# Patient Record
Sex: MALE | Race: WHITE | Employment: UNEMPLOYED | URBAN - METROPOLITAN AREA
[De-identification: names, ages, dates, MRNs, and addresses within clinical notes are randomized per-mention and may not be internally consistent; named-entity substitution may affect disease eponyms.]

---

## 2017-12-01 ENCOUNTER — HOSPITAL ENCOUNTER (OUTPATIENT)
Dept: GENERAL RADIOLOGY | Age: 22
Discharge: HOME | End: 2017-12-01
Attending: FAMILY MEDICINE
Payer: COMMERCIAL

## 2017-12-01 ENCOUNTER — OFFICE VISIT (OUTPATIENT)
Dept: FAMILY MEDICINE CLINIC | Facility: CLINIC | Age: 22
End: 2017-12-01

## 2017-12-01 VITALS
DIASTOLIC BLOOD PRESSURE: 71 MMHG | HEART RATE: 66 BPM | HEIGHT: 69.5 IN | TEMPERATURE: 98 F | WEIGHT: 209 LBS | BODY MASS INDEX: 30.26 KG/M2 | SYSTOLIC BLOOD PRESSURE: 132 MMHG

## 2017-12-01 DIAGNOSIS — M22.12 RECURRENT SUBLUXATION OF LEFT PATELLA: ICD-10-CM

## 2017-12-01 DIAGNOSIS — S83.012A LATERAL SUBLUXATION OF LEFT PATELLA, INITIAL ENCOUNTER: ICD-10-CM

## 2017-12-01 DIAGNOSIS — Z00.00 ADULT GENERAL MEDICAL EXAM: Primary | ICD-10-CM

## 2017-12-01 DIAGNOSIS — E66.09 NON MORBID OBESITY DUE TO EXCESS CALORIES: ICD-10-CM

## 2017-12-01 PROCEDURE — 99385 PREV VISIT NEW AGE 18-39: CPT | Performed by: FAMILY MEDICINE

## 2017-12-01 PROCEDURE — 73564 X-RAY EXAM KNEE 4 OR MORE: CPT | Performed by: FAMILY MEDICINE

## 2017-12-01 NOTE — PROGRESS NOTES
Patient ID: Kayode Mckay is a 25year old male. HPI  Patient presents with:  Physical  He states he graduated college in May. He is looking for a job. He does not smoke and he is single.     He states a few years ago he was at [employer] in an i Psychiatric/Behavioral: Negative for dysphoric mood and hallucinations. The patient is not nervous/anxious. Past Medical History:   Diagnosis Date   • Chicken pox 2001   • Fracture 05/2007    per [employer]: \"Fx blane vicente\";  \"Management: hepatosplenomegaly. There is no tenderness. Lymphadenopathy:     He has no cervical adenopathy. Neurological: He is alert and oriented to person, place, and time. He has normal reflexes. No cranial nerve deficit. Skin: Skin is warm and dry.  No rash n subluxation of left patella  -     XR KNEE, COMPLETE (4 OR MORE VIEWS), LEFT (ATX=[id number]); Future  -     PHYSICAL THERAPY - INTERNAL  He will start formal physical therapy and get an x-ray of the knee.   Lateral subluxation of left patella, initial encounter

## 2017-12-04 ENCOUNTER — TELEPHONE (OUTPATIENT)
Dept: OTHER | Age: 22
End: 2017-12-04

## 2017-12-04 NOTE — TELEPHONE ENCOUNTER
----- Message from Bushra Lang RN sent at 12/4/2017  8:40 AM CST -----      ----- Message -----  From: Ev Bonilla DO  Sent: 12/2/2017   6:04 PM  To:  Em Fm Ado Lpn/C[facility]    Left knee x-ray is normal.  You should really see a physical therapist due to t

## 2017-12-04 NOTE — TELEPHONE ENCOUNTER
Verified pt name and . Reviewed Xray results and recommendations with pt per doctor's instructions. Pt states he may consider having physical therapy for his knee after the holidays, did not want to start at this time.

## 2017-12-05 ENCOUNTER — TELEPHONE (OUTPATIENT)
Dept: FAMILY MEDICINE CLINIC | Facility: CLINIC | Age: 22
End: 2017-12-05

## 2017-12-05 NOTE — TELEPHONE ENCOUNTER
----- Message from Roge Aguilar DO sent at 12/2/2017  6:04 PM CST -----  Left knee x-ray is normal.  You should really see a physical therapist due to the frequent lateral subluxation of your left patella.

## (undated) NOTE — LETTER
December 5, 2017     [address] [facility] [id number]      Dear Maninder Olmstead:    Below are the results from your recent visit:  Left knee x-ray is normal.  You should really see a physical therapist due to the frequent lateral s